# Patient Record
Sex: MALE | Race: WHITE
[De-identification: names, ages, dates, MRNs, and addresses within clinical notes are randomized per-mention and may not be internally consistent; named-entity substitution may affect disease eponyms.]

---

## 2018-01-05 ENCOUNTER — HOSPITAL ENCOUNTER (EMERGENCY)
Dept: HOSPITAL 59 - ER | Age: 58
Discharge: HOME | End: 2018-01-05
Payer: MEDICAID
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
## 2018-01-05 DIAGNOSIS — S61.412A: Primary | ICD-10-CM

## 2018-01-05 PROCEDURE — 90715 TDAP VACCINE 7 YRS/> IM: CPT

## 2018-01-05 PROCEDURE — 12042 INTMD RPR N-HF/GENIT2.6-7.5: CPT

## 2018-01-05 PROCEDURE — 99283 EMERGENCY DEPT VISIT LOW MDM: CPT

## 2018-01-05 PROCEDURE — 96372 THER/PROPH/DIAG INJ SC/IM: CPT

## 2018-01-05 NOTE — EMERGENCY DEPARTMENT RECORD
History of Present Illness





- General


Chief Complaint: Laceration(s)


Stated Complaint: LACERATION ON LEFT HAND


Time Seen by Provider: 18 15:33


Source: Patient


Mode of Arrival: Ambulatory


Limitations: No limitations





- History of Present Illness


Initial Commments: 


The patient is here due to a L palm laceration that occurred about 40 minutes 

ago. He was working with a friend and lost his balance and was falling 

backwards. When he went to catch himself he cut his L palm on a sharp metal 

object. There is no numbness, tingling or bony tenderness. His Td is not UTD.





Onset/Timin


-: Minutes(s)


Place: Home


Context: Accidental


Associated Symptoms: None


Treatments Prior to Arrival: Bandage





- Keisha Coma Scale


Eye Response: (4) Open spontaneously


Motor Response: (6) Obeys commands





- Related Data


Hx Tetanus Toxoid Vaccination: Yes


Year of Tetanus Vaccination: 


Patient Tetanus UTD (within 5 yrs): Yes


 Home Medications











 Medication  Instructions  Recorded  Confirmed  Last Taken


 


Aspirin 81 mg PO DAILY 18 Unknown








 Previous Rx's











 Medication  Instructions  Recorded


 


Cephalexin [Keflex] 500 mg PO QID #28 cap 18


 


Hydrocodone/Acetaminophen [Norco 1 - 2 each PO .EVERY 4-6 HRS PRN 18





5-325 Tablet] #10 tablet 











 Allergies











Allergy/AdvReac Type Severity Reaction Status Date / Time


 


Sulfa (Sulfonamide Allergy  PT UNSURE Verified 18 15:27





Antibiotics)   OF REACTION  














Travel Screening





- Travel/Exposure Within Last 30 Days


Have you traveled within the last 30 days?: No





Review of Systems


Constitutional: Denies: Chills, Fever


Eyes: Denies: Eye discharge


ENT: Denies: Congestion


Respiratory: Denies: Cough





Past Medical History





- SOCIAL HISTORY


Smoking Status: Former smoker


Alcohol Use: None


Drug Use: None





- RESPIRATORY


Hx Respiratory Disorders: No





- CARDIOVASCULAR


Hx Cardio Disorders: No





- NEURO


Hx Neuro Disorders: No





- GI


Hx GI Disorders: No





- 


Hx Genitourinary Disorders: No





- ENDOCRINE


Hx Endocrine Disorders: No





- MUSCULOSKELETAL


Hx Musculoskeletal Disorders: No





- PSYCH


Hx Psych Problems: No





- HEMATOLOGY/ONCOLOGY


Hx Hematology/Oncology Disorders: No





Family Medical History


Any Significant Family History?: No





Physical Exam





- General


General Appearance: Alert, Cooperative, No acute distress





- Head


Head exam: Atraumatic, Normocephalic, Normal inspection





- Eye


Eye exam: Normal appearance, PERRL





- Extremities


Extremities exam: Full ROM, Normal capillary refill, Tenderness (only at the 

palmar laceration site. There is no bony tenderness.), Other (The L hand and 

fingers are NVI with normal sensation and tendon function.).  negative: Normal 

inspection (There is a 4 cm V shaped flap lac to the L thenar area.), Joint 

swelling


Image of Hand: 


  __________________________














  __________________________





 1 - V shaped flap lac.








Course





 Vital Signs











  18





  15:31


 


Temperature 98.6 F


 


Pulse Rate [ 79





Pulse Ox Probe] 


 


Respiratory 15





Rate 


 


Blood Pressure 149/89





[Left Arm] 


 


Pulse Ox 98














- Reevaluation(s)


Reevaluation #1: 


Procedure note: The L palm lac was cleansed with betadine and anesth. with 3 cc 

Lido 1% with Epi. The lac was explored and was a flap mainly and did not go 

into the muscle or to the fascia. The lac was lavaged copiously with sterile 

saline and closed with 10 4.0 nylon sutures. There were no complications.


18 16:22








Disposition


Disposition: Discharge


Clinical Impression: 


Laceration of hand


Qualifiers:


 Encounter type: initial encounter Foreign body presence: without foreign body 

Laterality: left Qualified Code(s): S61.412A - Laceration without foreign body 

of left hand, initial encounter





Disposition: Home, Self-Care


Condition: (2) Stable


Instructions:  Laceration (ED)


Additional Instructions: 


Keep dry for 2 days then no soaking or swimming. Watch for signs of infection. 

Take the Keflex as directed and use Norco or Tylenol if needed. Return to the 

ER in 10 days for suture removal.


Prescriptions: 


Cephalexin [Keflex] 500 mg PO QID #28 cap


Hydrocodone/Acetaminophen [Norco 5-325 Tablet] 1 - 2 each PO .EVERY 4-6 HRS PRN 

#10 tablet


 PRN Reason: Pain


Forms:  Patient Portal Access


Time of Disposition: 16:26





Quality





- Quality Measures


Quality Measures: N/A





- Blood Pressure Screening


View Details: Yes


Does Patient Have Any of the Following: No


Blood Pressure Classification: Pre-Hypertensive BP Reading


Systolic Measurement: 149


Diastolic Measurement: 89


Screening for High Blood Pressure: < Pre-Hypertensive BP, F/U Documented > [

]


Pre-Hypertensive Follow-up Interventions: Referral to alternative/primary care 

provider.

## 2018-01-17 ENCOUNTER — HOSPITAL ENCOUNTER (EMERGENCY)
Dept: HOSPITAL 59 - ER | Age: 58
Discharge: HOME | End: 2018-01-17
Payer: MEDICAID

## 2018-01-17 DIAGNOSIS — Z48.02: Primary | ICD-10-CM

## 2018-01-17 NOTE — EMERGENCY DEPARTMENT RECORD
History of Present Illness





- General


Stated Complaint: SUTURE REMOVAL


Time Seen by Provider: 18 10:02





- History of Present Illness


MD Complaint: Suture/staple removal


Onset/Timin


-: Days(s)


Initial Visit For: Laceration


Returns Today for: Staple/stitch removal


Symptoms Since Prior Visit: No new symptoms


Associated Symptoms: None





- Related Data


 Home Medications











 Medication  Instructions  Recorded  Confirmed  Last Taken


 


Carvedilol [Coreg] 3.125 mg PO DAILY 18 Unknown


 


Nitroglycerin [Nitroglycerin] 0.4 mg SL ASDIR 18 Unknown


 


Rosuvastatin Calcium [Rosuvastatin 40 mg PO DAILY 18 Unknown





Calcium]    











 Allergies











Allergy/AdvReac Type Severity Reaction Status Date / Time


 


Sulfa (Sulfonamide Allergy  PT UNSURE Verified 18 15:27





Antibiotics)   OF REACTION  














Travel Screening





- Travel/Exposure Within Last 30 Days


Have you traveled within the last 30 days?: No





Review of Systems


Reviewed: No additional complaints except as noted below


Constitutional: Reports: As per HPI.  Denies: Chills, Fever, Malaise, Night 

sweats, Weakness, Weight change


Eyes: Reports: As per HPI.  Denies: Eye discharge, Eye pain, Photophobia, 

Vision change


ENT: Reports: As per HPI.  Denies: Congestion, Dental pain, Ear pain, Epistaxis

, Hearing loss, Throat pain


Respiratory: Reports: As per HPI.  Denies: Cough, Dyspnea, Hemoptysis, Stridor, 

Wheezes


Cardiovascular: Reports: As per HPI.  Denies: Arrhythmia, Chest pain, Dyspnea 

on exertion, Edema, Murmurs, Orthopnea, Palpitations, Paroxysmal nocturnal 

dyspnea, Rheumatic Fever, Syncope


Endocrine: Reports: As per HPI.  Denies: Fatigue, Heat or cold intolerance, 

Polydipsia, Polyuria


Gastrointestinal: Reports: As per HPI.  Denies: Abdominal pain, Constipation, 

Diarrhea, Hematemesis, Hematochezia, Melena, Nausea, Vomiting


Genitourinary: Reports: As per HPI.  Denies: Dysuria, Frequency, Hematuria, 

Incontinence, Retention, Testicular pain, Testicular mass, Urgency


Musculoskeletal: Reports: As per HPI.  Denies: Arthralgia, Back pain, Gout, 

Joint swelling, Myalgia, Neck pain


Skin: Reports: As per HPI.  Denies: Bruising, Change in color, Change in hair/

nails, Lesions, Pruritus, Rash


Neurological: Reports: As per HPI.  Denies: Abnormal gait, Confusion, Headache, 

Numbness, Paresthesias, Seizure, Tingling, Tremors, Vertigo, Weakness


Psychiatric: Reports: As per HPI.  Denies: Anxiety, Auditory hallucinations, 

Depression, Homicidal thoughts, Suicidal thoughts, Visual hallucinations


Hematological/Lymphatic: Reports: As per HPI.  Denies: Anemia, Blood Clots, 

Easy bleeding, Easy bruising, Swollen glands





Past Medical History





- SOCIAL HISTORY


Smoking Status: Former smoker


Alcohol Use: None


Drug Use: None





- RESPIRATORY


Hx Respiratory Disorders: No





- CARDIOVASCULAR


Hx Cardio Disorders: No





- NEURO


Hx Neuro Disorders: No





- GI


Hx GI Disorders: No





- 


Hx Genitourinary Disorders: No





- ENDOCRINE


Hx Endocrine Disorders: No





- MUSCULOSKELETAL


Hx Musculoskeletal Disorders: No





- PSYCH


Hx Psych Problems: No





- HEMATOLOGY/ONCOLOGY


Hx Hematology/Oncology Disorders: No





Family Medical History


Any Significant Family History?: No





Physical Exam





- General


General Appearance: Alert, Oriented x3, Cooperative, No acute distress





- Head


Head exam: Normal inspection





- Eye


Eye exam: Normal appearance, PERRL


Pupils: Normal accommodation





- ENT


ENT exam: Normal exam, Mucous membranes moist, Normal external ear exam, Normal 

orophraynx, TM's normal bilaterally


Ear exam: Normal external inspection.  negative: External canal tenderness


Nasal Exam: Normal inspection.  negative: Discharge, Sinus tenderness


Mouth exam: Normal external inspection, Tongue normal


Teeth exam: Normal inspection.  negative: Dental caries


Throat exam: Normal inspection.  negative: Tonsillar erythema, Tonsillar exudate





- Neck


Neck exam: Normal inspection, Full ROM.  negative: Tenderness





- Respiratory


Respiratory exam: Normal lung sounds bilaterally.  negative: Respiratory 

distress





- Cardiovascular


Cardiovascular Exam: Regular rate, Normal rhythm, Normal heart sounds





- GI/Abdominal


GI/Abdominal exam: Soft, Normal bowel sounds.  negative: Tenderness





- Rectal


Rectal exam: Deferred





- 


 exam: Deferred





- Extremities


Extremities exam: Normal inspection, Full ROM, Normal capillary refill.  

negative: Tenderness





- Back


Back exam: Reports: Normal inspection, Full ROM.  Denies: Muscle spasm, Rash 

noted, Tenderness





- Neurological


Neurological exam: Alert, Normal gait, Oriented X3, Reflexes normal





- Psychiatric


Psychiatric exam: Normal affect, Normal mood





- Skin


Skin exam: Dry, Intact, Normal color, Warm





Course





 Vital Signs











  18





  09:56


 


Temperature 98.0 F


 


Pulse Rate [ 64





Pulse Ox Probe] 


 


Respiratory 18





Rate 


 


Blood Pressure 143/77





[Left Arm] 


 


Pulse Ox 99














- Reevaluation(s)


Reevaluation #1: 


no signs of infection and healing nicely


18 10:19








Disposition


Clinical Impression: 


 Visit for suture removal





Disposition: Home, Self-Care


Condition: (1) Good


Instructions:  Stitches Removal (ED)


Additional Instructions: 


follow up with family DrGonzalo


Time of Disposition: 10:18





Quality





- Quality Measures


Quality Measures: N/A





- Blood Pressure Screening


Does Patient Have Any of the Following: No


Blood Pressure Classification: Hypertensive Reading


Systolic Measurement: 143


Diastolic Measurement: 77


Screening for High Blood Pressure: < First Hypertensive BP, F/U Documented > [

]


First Hypertensive Follow-up Interventions: Referral to alternative/primary 

care provider.